# Patient Record
Sex: MALE | Race: WHITE | URBAN - METROPOLITAN AREA
[De-identification: names, ages, dates, MRNs, and addresses within clinical notes are randomized per-mention and may not be internally consistent; named-entity substitution may affect disease eponyms.]

---

## 2017-01-04 ENCOUNTER — OFFICE VISIT (OUTPATIENT)
Dept: DERMATOLOGY | Facility: CLINIC | Age: 22
End: 2017-01-04

## 2017-01-04 DIAGNOSIS — Z79.899 MEDICATION MANAGEMENT: ICD-10-CM

## 2017-01-04 DIAGNOSIS — L70.0 ACNE VULGARIS: Primary | ICD-10-CM

## 2017-01-04 RX ORDER — ISOTRETINOIN 40 MG/1
40 CAPSULE ORAL 2 TIMES DAILY
Qty: 60 CAPSULE | Refills: 0 | Status: SHIPPED | OUTPATIENT
Start: 2017-01-04 | End: 2017-05-08

## 2017-01-04 ASSESSMENT — PAIN SCALES - GENERAL: PAINLEVEL: NO PAIN (0)

## 2017-01-04 NOTE — Clinical Note
"1/4/2017       RE: Kris Campbell  [address] [facility] [address]     Dear Colleague,    Thank you for referring your patient, Kris Campbell, to the [facility] DERMATOLOGY at [facility]. Please see a copy of my visit note below.    [facility] Dermatology Note      Dermatology Problem List:  1. Acne vulgaris, has used solodyn and benzoyl peroxide.   - on Accutane   2. Hypopigmentation from Tinea Versicolor  -s/p using ketoconazole 2% shampoo for maintenance     CC:   Chief Complaint   Patient presents with     Derm Problem     Kris comes to clinic for accutane follow up. Kris states his acne \" is the same.\"         Encounter Date: Jan 4, 2017    History of Present Illness:  Mr. Kris Campbell is a 21 year old male who presents after one month for a follow up for accutane. In the last month, he has had \"just one\" new (small) breakout. No nosebleeds. He has noticed his gums are more sensitive, bleeding easier with vigorous brushing. The patient reports tolerable mucocutaneous dryness, and denies arthralgias, myalgias, depression, suicidal ideation, headache, or blurred vision. The patient reports no other lesions of concern at this time.    Past Medical History:   Patient Active Problem List   Diagnosis     Acne vulgaris     Tinea versicolor     No past medical history on file.  No past surgical history on file.    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    Family History:  There is a family history of non-melanoma skin cancer in the patient's mother.    Medications:  Current Outpatient Prescriptions   Medication Sig Dispense Refill     ISOtretinoin (ACCUTANE) 40 MG capsule Take 1 capsule (40 mg) by mouth 2 times daily 60 capsule 0     ketoconazole (NIZORAL) 2 % shampoo Apply to the affected area and wash off after 3-5 minutes. Do this for one month, then reduce to once weekly. " 120 mL 11     No Known Allergies      Review of Systems:  -Skin: As above in HPI. No additional skin concerns.    Physical exam:  Vitals: There were no vitals taken for this visit.  GEN: This is a well developed, well-nourished male in no acute distress, in a pleasant mood.    SKIN: Acne exam, which includes the face, neck, upper central chest, and upper central back was performed.  - Hyperemic macules to cheeks and chin. There is one resolving acneform to his chin.   - Mild xerosis noted to lips  - Chest and back are clear  - No other lesions of concern on areas examined.     Impression/Plan:  1. Acne vulgaris, resistant to minocycline and benzoyl peroxide.   - Discussion of the risks and side effects of Accutane including but not limited to mucocutaneous dryness, arthralgias, myalgias, depression, suicidal ideation, headache, blurred vision,  increase in liver function tests, increase in lipids, and teratogenic effects.  The ipledgeprogram brochure was provided and the contents discussed with the patient. The patient was counseled they cannot give blood while on Accutane. No personal or family history of inflammatory bowel disease or hypertriglyceridemia known to patient. Reviewed need to avoid alcohol on medication.   Ipledge program consent was reviewed.    - For month #5, continue Accutane to 40 mg bid  One month supply with no refills provided.  Goal dose is 10,568 mg for 150 mg/kg dosing in this 70 kg patient (155 lb).  Can increase up to 220 mg if slow to improve.  - Cumulative dose: 8,400 mg (120 mg/kg)  -Repeat labs including CBC, CMP and triglycerides today were within normal limits.  Because these have remained normal, okay to d/c labs.   - Ipledge number:[id number]       Follow up in 30 days, earlier for new or changing lesions.    Staff Involved:  Scribe/Staff  I, Javier Paulino, am serving as a scribe to document services personally performed by Solange Herring PA-C, based on data collection and the  provider's statements to me.    Provider Disclosure:   I agree with above History, Review of Systems, Physical exam and Plan. I have reviewed the content of the documentation and have edited it as needed. I have personally performed the services documented here and the documentation accurately represents those services and the decisions I have made.     All risks, benefits and alternatives were discussed with patient.  Patient is in agreement and understands the assessment and plan.  All questions were answered.  Sun Screen Education was given.   Return to Clinic in 1 month or sooner as needed.   Solange Herring PA-C     Again, thank you for allowing me to participate in the care of your patient.      Sincerely,    Solange Herring PA-C

## 2017-01-04 NOTE — Clinical Note
Date:January 6, 2017      Patient was self referred, no letter generated. Do not send.        [facility] Health Information

## 2017-01-04 NOTE — PROGRESS NOTES
"[facility] Dermatology Note      Dermatology Problem List:  1. Acne vulgaris, has used solodyn and benzoyl peroxide.   - on Accutane   2. Hypopigmentation from Tinea Versicolor  -s/p using ketoconazole 2% shampoo for maintenance     CC:   Chief Complaint   Patient presents with     Derm Problem     Kris comes to clinic for accutane follow up. Kris states his acne \" is the same.\"         Encounter Date: Jan 4, 2017    History of Present Illness:  Mr. Kris Campbell is a 21 year old male who presents after one month for a follow up for accutane. In the last month, he has had \"just one\" new (small) breakout. No nosebleeds. He has noticed his gums are more sensitive, bleeding easier with vigorous brushing. The patient reports tolerable mucocutaneous dryness, and denies arthralgias, myalgias, depression, suicidal ideation, headache, or blurred vision. The patient reports no other lesions of concern at this time.    Past Medical History:   Patient Active Problem List   Diagnosis     Acne vulgaris     Tinea versicolor     No past medical history on file.  No past surgical history on file.    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    Family History:  There is a family history of non-melanoma skin cancer in the patient's mother.    Medications:  Current Outpatient Prescriptions   Medication Sig Dispense Refill     ISOtretinoin (ACCUTANE) 40 MG capsule Take 1 capsule (40 mg) by mouth 2 times daily 60 capsule 0     ketoconazole (NIZORAL) 2 % shampoo Apply to the affected area and wash off after 3-5 minutes. Do this for one month, then reduce to once weekly. 120 mL 11     No Known Allergies      Review of Systems:  -Skin: As above in HPI. No additional skin concerns.    Physical exam:  Vitals: There were no vitals taken for this visit.  GEN: This is a well developed, well-nourished male in no acute distress, in a pleasant mood.  "   SKIN: Acne exam, which includes the face, neck, upper central chest, and upper central back was performed.  - Hyperemic macules to cheeks and chin. There is one resolving acneform to his chin.   - Mild xerosis noted to lips  - Chest and back are clear  - No other lesions of concern on areas examined.     Impression/Plan:  1. Acne vulgaris, resistant to minocycline and benzoyl peroxide.   - Discussion of the risks and side effects of Accutane including but not limited to mucocutaneous dryness, arthralgias, myalgias, depression, suicidal ideation, headache, blurred vision,  increase in liver function tests, increase in lipids, and teratogenic effects.  The ipledgeprogram brochure was provided and the contents discussed with the patient. The patient was counseled they cannot give blood while on Accutane. No personal or family history of inflammatory bowel disease or hypertriglyceridemia known to patient. Reviewed need to avoid alcohol on medication.   Ipledge program consent was reviewed.    - For month #5, continue Accutane to 40 mg bid  One month supply with no refills provided.  Goal dose is 10,568 mg for 150 mg/kg dosing in this 70 kg patient (155 lb).  Can increase up to 220 mg if slow to improve.  - Cumulative dose: 8,400 mg (120 mg/kg)  -Repeat labs including CBC, CMP and triglycerides today were within normal limits.  Because these have remained normal, okay to d/c labs.   - Ipledge number:[id number]       Follow up in 30 days, earlier for new or changing lesions.    Staff Involved:  Scribe/Staff  I, Javier Paulino, am serving as a scribe to document services personally performed by Solange Herring PA-C, based on data collection and the provider's statements to me.    Provider Disclosure:   I agree with above History, Review of Systems, Physical exam and Plan. I have reviewed the content of the documentation and have edited it as needed. I have personally performed the services documented here and the  documentation accurately represents those services and the decisions I have made.     All risks, benefits and alternatives were discussed with patient.  Patient is in agreement and understands the assessment and plan.  All questions were answered.  Sun Screen Education was given.   Return to Clinic in 1 month or sooner as needed.   Solange Herring PA-C

## 2017-01-04 NOTE — NURSING NOTE
"Dermatology Rooming Note    Kris Lopezarturo's goals for this visit include:   Chief Complaint   Patient presents with     Derm Problem     Kris comes to clinic for accutane follow up. Kris states his acne \" is the same.\"     Jaqueline Dias LPN  "

## 2017-02-08 ENCOUNTER — OFFICE VISIT (OUTPATIENT)
Dept: DERMATOLOGY | Facility: CLINIC | Age: 22
End: 2017-02-08

## 2017-02-08 DIAGNOSIS — L70.0 ACNE VULGARIS: Primary | ICD-10-CM

## 2017-02-08 RX ORDER — TRETINOIN 0.5 MG/G
CREAM TOPICAL
Qty: 45 G | Refills: 11 | Status: SHIPPED
Start: 2017-02-08

## 2017-02-08 ASSESSMENT — PAIN SCALES - GENERAL: PAINLEVEL: NO PAIN (0)

## 2017-02-08 NOTE — NURSING NOTE
"Dermatology Rooming Note    Kris Campbell's goals for this visit include:   Chief Complaint   Patient presents with     Derm Problem     AccKris rizzo states \" this is my final checkup, my acne is basically gone.\"     Jaqueline Dias LPN  "

## 2017-02-08 NOTE — PROGRESS NOTES
"[facility] Dermatology Note    Dermatology Problem List:  1. Acne vulgaris, has used solodyn and benzoyl peroxide s/p Accutane   - tretinoin 0.05% cream  2. Hypopigmentation from Tinea Versicolor  -s/p ketoconazole 2% shampoo for maintenance     CC:   Chief Complaint   Patient presents with     Derm Problem     Kris Suero states \" this is my final checkup, my acne is basically gone.\"     Encounter Date: Feb 8, 2017    History of Present Illness:  Mr. Kris Campbell is a 21 year old male who presents after one month for a follow up for accutane. The patient was last seen on 1/4/17 when he continued 40 mg bid. Today the patient reports that his acne is basically gone and he had one pimple. The patient denies any nosebleeds, irritated dry eyes, headaches with blurred vision, muscle or joint aches, changes in bowel habits, depressive thoughts, sharing medication, or donating blood. He would like to be done with accutane today if he has met the goal dose. The patient reports no other lesions of concern at this time.     Otherwise, the patient reports no painful, bleeding, nonhealing, or pruritic lesions, and denies new or changing moles.    Past Medical History:   Patient Active Problem List   Diagnosis     Acne vulgaris     Tinea versicolor     No past medical history on file.  No past surgical history on file.    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    Family History:  There is a family history of non-melanoma skin cancer in the patient's mother.    Medications:  Current Outpatient Prescriptions   Medication Sig Dispense Refill     ISOtretinoin (ACCUTANE) 40 MG capsule Take 1 capsule (40 mg) by mouth 2 times daily 60 capsule 0     ketoconazole (NIZORAL) 2 % shampoo Apply to the affected area and wash off after 3-5 minutes. Do this for one month, then reduce to once weekly. 120 mL 11     No Known Allergies    Review " of Systems:  -Skin: As above in HPI. No additional skin concerns.    Physical exam:  Vitals: There were no vitals taken for this visit.  GEN: This is a well developed, well-nourished male in no acute distress, in a pleasant mood.    SKIN: Acne exam, which includes the face, neck, upper central chest, and upper central back was performed.  - No acne to face  - Few hyperemic macules from previous acne  - No other lesions of concern on areas examined.     Impression/Plan:  1. Acne vulgaris, completed 5 months of isotretinoin (approximately 150 mg/kg which totals to a cumulative dose of 10,800 mg in this 70 kg male)  - Stop Accutane.   - Discuss skin care and sun screen use, as well as avoiding any unnecessary procedures for 6 months and no donating blood for 1 month.  - Start tretinoin 0.05% cream - apply a pea size amount to a clean, dry face every other night, increase to every night as tolerated Recommended to start this topical medication after 1 month.       Follow up in 3 months, earlier for new or changing lesions.    Staff Involved:  Scribe Disclosure:   I, Leatha Wolfe, am serving as a scribe to document services personally performed by Solange Herring, based on data collection and the provider's statements to me.    Provider Disclosure:   The documentation recorded by the scribe accurately reflects the services I personally performed and the decisions made by me.    All risks, benefits and alternatives were discussed with patient.  Patient is in agreement and understands the assessment and plan.  All questions were answered.  Sun Screen Education was given.   Return to Clinic in 3 months or sooner as needed.   Solange Herring PA-C   [facility] Dermatology [facility]

## 2017-02-08 NOTE — Clinical Note
Date:February 9, 2017      Patient was self referred, no letter generated. Do not send.        [facility] Health Information

## 2017-02-08 NOTE — Clinical Note
"2/8/2017       RE: Kris Campbell  [address] [facility] [address]     Dear Colleague,    Thank you for referring your patient, Kris Campbell, to the [facility] DERMATOLOGY at [facility]. Please see a copy of my visit note below.    [facility] Dermatology Note    Dermatology Problem List:  1. Acne vulgaris, has used solodyn and benzoyl peroxide s/p Accutane   - tretinoin 0.05% cream  2. Hypopigmentation from Tinea Versicolor  -s/p ketoconazole 2% shampoo for maintenance     CC:   Chief Complaint   Patient presents with     Derm Problem     Pio, Kris states \" this is my final checkup, my acne is basically gone.\"     Encounter Date: Feb 8, 2017    History of Present Illness:  Mr. Kris Campbell is a 21 year old male who presents after one month for a follow up for accutane. The patient was last seen on 1/4/17 when he continued 40 mg bid. Today the patient reports that his acne is basically gone and he had one pimple. The patient denies any nosebleeds, irritated dry eyes, headaches with blurred vision, muscle or joint aches, changes in bowel habits, depressive thoughts, sharing medication, or donating blood. He would like to be done with accutane today if he has met the goal dose. The patient reports no other lesions of concern at this time.     Otherwise, the patient reports no painful, bleeding, nonhealing, or pruritic lesions, and denies new or changing moles.    Past Medical History:   Patient Active Problem List   Diagnosis     Acne vulgaris     Tinea versicolor     No past medical history on file.  No past surgical history on file.    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    Family History:  There is a family history of non-melanoma skin cancer in the patient's mother.    Medications:  Current Outpatient Prescriptions   Medication Sig Dispense Refill     ISOtretinoin " (ACCUTANE) 40 MG capsule Take 1 capsule (40 mg) by mouth 2 times daily 60 capsule 0     ketoconazole (NIZORAL) 2 % shampoo Apply to the affected area and wash off after 3-5 minutes. Do this for one month, then reduce to once weekly. 120 mL 11     No Known Allergies    Review of Systems:  -Skin: As above in HPI. No additional skin concerns.    Physical exam:  Vitals: There were no vitals taken for this visit.  GEN: This is a well developed, well-nourished male in no acute distress, in a pleasant mood.    SKIN: Acne exam, which includes the face, neck, upper central chest, and upper central back was performed.  - No acne to face  - Few hyperemic macules from previous acne  - No other lesions of concern on areas examined.     Impression/Plan:  1. Acne vulgaris, completed 5 months of isotretinoin (approximately 150 mg/kg which totals to a cumulative dose of 10,800 mg in this 70 kg male)  - Stop Accutane.   - Discuss skin care and sun screen use, as well as avoiding any unnecessary procedures for 6 months and no donating blood for 1 month.  - Start tretinoin 0.05% cream - apply a pea size amount to a clean, dry face every other night, increase to every night as tolerated Recommended to start this topical medication after 1 month.       Follow up in 3 months, earlier for new or changing lesions.    Staff Involved:  Scribe Disclosure:   PAUL, Leatha Wolfe, am serving as a scribe to document services personally performed by Solange Herring, based on data collection and the provider's statements to me.    Provider Disclosure:   The documentation recorded by the scribe accurately reflects the services I personally performed and the decisions made by me.    All risks, benefits and alternatives were discussed with patient.  Patient is in agreement and understands the assessment and plan.  All questions were answered.  Sun Screen Education was given.   Return to Clinic in 3 months or sooner as needed.   Solange Herring PA-C    [facility]         Again, thank you for allowing me to participate in the care of your patient.      Sincerely,    Solange Herring PA-C

## 2017-02-08 NOTE — PATIENT INSTRUCTIONS
Topical Retinoids    What are topical retinoids?    These are medicines that are related to Vitamin A. They are used on the skin.    Retin-A , Renova , Differin , and Tazorac  are brand names.    Come in creams and clear gels    Used to treat skin conditions like pimples (acne), face wrinkling, or dark-colored sunspots    How do I use these medicines?    Wash face and let dry for 15 to 30 minutes.    Use a large pea-size amount of medicine to cover the whole face. Do not put on close to the eyes and lips.  Rub in gently.     Start by using every other day.  If you have no irritation after a few days, start to use it daily.      You might have too much irritation with daily use. Use it less often until the irritation goes away.  Then try to increase slowly to daily use.     Irritation improves over time.    You may use moisturizer if your skin becomes dry. Look for  non-comedogenic  (non-pore plugging) and oil free products.      What are the side effects?    Dryness     Peeling and flaking     Irritation of the skin     Possible increased chance of sunburns.  Protect your skin from sunlight. Wear a hat and use a sunscreen with SPF 30 or higher. Your sunscreen should have both UVA and UVB (broad-spectrum) protection.

## 2017-05-08 ENCOUNTER — OFFICE VISIT (OUTPATIENT)
Dept: DERMATOLOGY | Facility: CLINIC | Age: 22
End: 2017-05-08

## 2017-05-08 DIAGNOSIS — B36.0 TINEA VERSICOLOR: ICD-10-CM

## 2017-05-08 RX ORDER — KETOCONAZOLE 20 MG/ML
SHAMPOO TOPICAL
Qty: 120 ML | Refills: 11 | Status: SHIPPED | OUTPATIENT
Start: 2017-05-08

## 2017-05-08 ASSESSMENT — PAIN SCALES - GENERAL: PAINLEVEL: NO PAIN (0)

## 2017-05-08 NOTE — PROGRESS NOTES
"[facility] Dermatology Note    Dermatology Problem List:  1. Acne vulgaris, has used solodyn and benzoyl peroxide s/p Accutane   - tretinoin 0.05% cream  completed 5 months of isotretinoin (approximately 150 mg/kg) February 2017  2. Hypopigmentation from Tinea Versicolor  -s/p ketoconazole 2% shampoo for maintenance     CC:   Chief Complaint   Patient presents with     Derm Problem     Post accutane follow up, Kris states \" I'm doing good, just a few breakouts here and there.\"     Encounter Date: May 8, 2017    History of Present Illness:  Mr. Kris Campbell is a 21 year old male who presents after three month for a follow up for accutane. The patient was last seen on 2/8/17 when he completed Accutane.Today the patient he has only had a few pimples that went away very quickly reports that his acne is basically gone and he had one pimple. The patient denies any nosebleeds, irritated dry eyes, headaches with blurred vision, muscle or joint aches, changes in bowel habits, depressive thoughts, sharing medication, or donating blood. He used the tretinoin for a month but has not continued the topical medication as it was too drying and he felt he didn't need it.     He uses ketoconazole 2% shampoo once weekly to avoid the tinea versicolor on his trunk from returning. He believes this is working well.     The patient reports no other lesions of concern at this time.     Otherwise, the patient reports no painful, bleeding, nonhealing, or pruritic lesions, and denies new or changing moles.    Past Medical History:   Patient Active Problem List   Diagnosis     Acne vulgaris     Tinea versicolor     No past medical history on file.  No past surgical history on file.    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    Family History:  There is a family history of non-melanoma skin cancer in the patient's mother.    Medications:  Current " Outpatient Prescriptions   Medication Sig Dispense Refill     ketoconazole (NIZORAL) 2 % shampoo Apply to the affected area and wash off after 3-5 minutes. Do this for one month, then reduce to once weekly. 120 mL 11     tretinoin (RETIN-A) 0.05 % cream Spread a pea size amount into affected area topically at bedtime. Apply every other night and increase to nightly as tolerated.  Use sunscreen SPF>20. 45 g 11     No Known Allergies    Review of Systems:  -Skin: As above in HPI. No additional skin concerns.    Physical exam:  Vitals: There were no vitals taken for this visit.  GEN: This is a well developed, well-nourished male in no acute distress, in a pleasant mood.    SKIN: Acne exam, which includes the face, neck, upper central chest, and upper central back was performed.  - No acne to face  - Faint few hyperemic macules from previous acne  - Faint hypopigmented macules scattered to trunk   - No other lesions of concern on areas examined.     Impression/Plan:  1. Acne vulgaris, completed 5 months of isotretinoin (approximately 150 mg/kg which totals to a cumulative dose of 10,800 mg in this 70 kg male)  - Discuss skin care and sun screen use, as well as avoiding any unnecessary procedures for 6 months and no donating blood for 1 month.  - Use tretinoin 0.05% cream, as needed  - apply a pea size amount to a clean, dry face every other night, increase to every night as tolerated Recommended to start this topical medication after 1 month.     2. Hx of tinea versicolor, no active eruption now. Continue using Ketoconazole 2% shampoo in shower as a body wash once weekly to avoid recurrence.     Follow up as needed.       All risks, benefits and alternatives were discussed with patient.  Patient is in agreement and understands the assessment and plan.  All questions were answered.  Sun Screen Education was given.   Return to Clinic as needed.   Solange Herring PA-C   [facility]ermat[facility]

## 2017-05-08 NOTE — LETTER
Date:May 9, 2017      Patient was self referred, no letter generated. Do not send.        [facility] Health Information

## 2017-05-08 NOTE — MR AVS SNAPSHOT
After Visit Summary   5/8/2017    Kris Campbell    MRN: [id number]           Patient Information     Date Of Birth          1995        Visit Information        Provider Department      5/8/2017 10:45 AM Solange Herring PA-C [facility]        Today's Diagnoses     Tinea versicolor           Follow-ups after your visit        Follow-up notes from your care team     Return if symptoms worsen or fail to improve.      Who to contact     Please call your clinic at [phone] to:    Ask questions about your health    Make or cancel appointments    Discuss your medicines    Learn about your test results    Speak to your doctor   If you have compliments or concerns about an experience at your clinic, or if you wish to file a complaint, please contact [facility] Patient Relations at [phone] or email us at [email or web address]         Additional Information About Your Visit        MyChart Information     [employer] gives you secure access to your electronic health record. If you see a primary care provider, you can also send messages to your care team and make appointments. If you have questions, please call your primary care clinic.  If you do not have a primary care provider, please call [phone] and they will assist you.      [employer] is an electronic gateway that provides easy, online access to your medical records. With [employer], you can request a clinic appointment, read your test results, renew a prescription or communicate with your care team.     To access your existing account, please contact your [facility] or call [phone] for assistance.        Care EveryWhere ID     This is your Care EveryWhere ID. This could be used by other organizations to access your [place] medical records  [id number]         Blood Pressure from Last 3 Encounters:   02/28/15 131/75    Weight from Last 3 Encounters:    02/28/15 68 kg (150 lb)              Today, you had the following     No orders found for display         Where to get your medicines      These medications were sent to [id number] - [place], [place] - [address]  [address] [facility] [address]     Phone:  [phone]     ketoconazole 2 % shampoo          Primary Care Provider    No Ref Primary Verified Flk Only       No address on file        Thank you!     Thank you for choosing [facility]  for your care. Our goal is always to provide you with excellent care. Hearing back from our patients is one way we can continue to improve our services. Please take a few minutes to complete the written survey that you may receive in the mail after your visit with us. Thank you!             Your Updated Medication List - Protect others around you: Learn how to safely use, store and throw away your medicines at www.disposemymeds.org.          This list is accurate as of: 5/8/17 11:47 AM.  Always use your most recent med list.                   Brand Name Dispense Instructions for use    ketoconazole 2 % shampoo    NIZORAL    120 mL    Apply to the affected area and wash off after 3-5 minutes. Do this for one month, then reduce to once weekly.       tretinoin 0.05 % cream    RETIN-A    45 g    Spread a pea size amount into affected area topically at bedtime. Apply every other night and increase to nightly as tolerated.  Use sunscreen SPF>20.

## 2017-05-08 NOTE — LETTER
"5/8/2017       RE: Kris Campbell  [address] [facility] [address]     Dear Colleague,    Thank you for referring your patient, Kris Campbell, to the [facility] D[facility] at [facility]. Please see a copy of my visit note below.    [facility] Dermat[facility] Note    Dermatology Problem List:  1. Acne vulgaris, has used solodyn and benzoyl peroxide s/p Accutane   - tretinoin 0.05% cream  completed 5 months of isotretinoin (approximately 150 mg/kg) February 2017  2. Hypopigmentation from Tinea Versicolor  -s/p ketoconazole 2% shampoo for maintenance     CC:   Chief Complaint   Patient presents with     Derm Problem     Post accutane follow up, Kris states \" I'm doing good, just a few breakouts here and there.\"     Encounter Date: May 8, 2017    History of Present Illness:  Mr. Kris Campbell is a 21 year old male who presents after three month for a follow up for accutane. The patient was last seen on 2/8/17 when he completed Accutane.Today the patient he has only had a few pimples that went away very quickly reports that his acne is basically gone and he had one pimple. The patient denies any nosebleeds, irritated dry eyes, headaches with blurred vision, muscle or joint aches, changes in bowel habits, depressive thoughts, sharing medication, or donating blood. He used the tretinoin for a month but has not continued the topical medication as it was too drying and he felt he didn't need it.     He uses ketoconazole 2% shampoo once weekly to avoid the tinea versicolor on his trunk from returning. He believes this is working well.     The patient reports no other lesions of concern at this time.     Otherwise, the patient reports no painful, bleeding, nonhealing, or pruritic lesions, and denies new or changing moles.    Past Medical History:   Patient Active Problem List   Diagnosis     Acne vulgaris     Tinea versicolor     No past medical history on " file.  No past surgical history on file.    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    Family History:  There is a family history of non-melanoma skin cancer in the patient's mother.    Medications:  Current Outpatient Prescriptions   Medication Sig Dispense Refill     ketoconazole (NIZORAL) 2 % shampoo Apply to the affected area and wash off after 3-5 minutes. Do this for one month, then reduce to once weekly. 120 mL 11     tretinoin (RETIN-A) 0.05 % cream Spread a pea size amount into affected area topically at bedtime. Apply every other night and increase to nightly as tolerated.  Use sunscreen SPF>20. 45 g 11     No Known Allergies    Review of Systems:  -Skin: As above in HPI. No additional skin concerns.    Physical exam:  Vitals: There were no vitals taken for this visit.  GEN: This is a well developed, well-nourished male in no acute distress, in a pleasant mood.    SKIN: Acne exam, which includes the face, neck, upper central chest, and upper central back was performed.  - No acne to face  - Faint few hyperemic macules from previous acne  - Faint hypopigmented macules scattered to trunk   - No other lesions of concern on areas examined.     Impression/Plan:  1. Acne vulgaris, completed 5 months of isotretinoin (approximately 150 mg/kg which totals to a cumulative dose of 10,800 mg in this 70 kg male)  - Discuss skin care and sun screen use, as well as avoiding any unnecessary procedures for 6 months and no donating blood for 1 month.  - Use tretinoin 0.05% cream, as needed  - apply a pea size amount to a clean, dry face every other night, increase to every night as tolerated Recommended to start this topical medication after 1 month.     2. Hx of tinea versicolor, no active eruption now. Continue using Ketoconazole 2% shampoo in shower as a body wash once weekly to avoid recurrence.     Follow up as needed.       All risks, benefits  and alternatives were discussed with patient.  Patient is in agreement and understands the assessment and plan.  All questions were answered.  Sun Screen Education was given.   Return to Clinic as needed.   Solange Herring PA-C   [facility]       Again, thank you for allowing me to participate in the care of your patient.      Sincerely,    Solange Herring PA-C

## 2017-05-08 NOTE — NURSING NOTE
"Dermatology Rooming Note    Kris Campbell's goals for this visit include:   Chief Complaint   Patient presents with     Derm Problem     Post accutane follow up, Kris states \" I'm doing good, just a few breakouts here and there.\"     Jaqueline Dias LPN  "

## 2020-03-10 ENCOUNTER — HEALTH MAINTENANCE LETTER (OUTPATIENT)
Age: 25
End: 2020-03-10

## 2020-12-20 ENCOUNTER — HEALTH MAINTENANCE LETTER (OUTPATIENT)
Age: 25
End: 2020-12-20

## 2021-04-24 ENCOUNTER — HEALTH MAINTENANCE LETTER (OUTPATIENT)
Age: 26
End: 2021-04-24

## 2021-10-03 ENCOUNTER — HEALTH MAINTENANCE LETTER (OUTPATIENT)
Age: 26
End: 2021-10-03

## 2022-05-15 ENCOUNTER — HEALTH MAINTENANCE LETTER (OUTPATIENT)
Age: 27
End: 2022-05-15

## 2022-09-11 ENCOUNTER — HEALTH MAINTENANCE LETTER (OUTPATIENT)
Age: 27
End: 2022-09-11

## 2023-06-03 ENCOUNTER — HEALTH MAINTENANCE LETTER (OUTPATIENT)
Age: 28
End: 2023-06-03